# Patient Record
Sex: MALE | Race: WHITE | Employment: FULL TIME | URBAN - METROPOLITAN AREA
[De-identification: names, ages, dates, MRNs, and addresses within clinical notes are randomized per-mention and may not be internally consistent; named-entity substitution may affect disease eponyms.]

---

## 2018-12-23 ENCOUNTER — HOSPITAL ENCOUNTER (EMERGENCY)
Age: 38
Discharge: HOME | End: 2018-12-23
Attending: EMERGENCY MEDICINE
Payer: OTHER MISCELLANEOUS

## 2018-12-23 ENCOUNTER — APPOINTMENT (OUTPATIENT)
Dept: GENERAL RADIOLOGY | Age: 38
End: 2018-12-23
Payer: OTHER MISCELLANEOUS

## 2018-12-23 ENCOUNTER — APPOINTMENT (OUTPATIENT)
Dept: CT IMAGING | Age: 38
End: 2018-12-23
Payer: OTHER MISCELLANEOUS

## 2018-12-23 VITALS
RESPIRATION RATE: 18 BRPM | DIASTOLIC BLOOD PRESSURE: 90 MMHG | HEART RATE: 77 BPM | WEIGHT: 175 LBS | OXYGEN SATURATION: 98 % | SYSTOLIC BLOOD PRESSURE: 125 MMHG | HEIGHT: 70 IN | TEMPERATURE: 97.8 F | BODY MASS INDEX: 25.05 KG/M2

## 2018-12-23 DIAGNOSIS — S16.1XXA ACUTE STRAIN OF NECK MUSCLE, INITIAL ENCOUNTER: Primary | ICD-10-CM
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
## 2018-12-23 PROCEDURE — 72070 X-RAY EXAM THORAC SPINE 2VWS: CPT

## 2018-12-23 PROCEDURE — 99284 EMERGENCY DEPT VISIT MOD MDM: CPT

## 2018-12-23 PROCEDURE — 72125 CT NECK SPINE W/O DYE: CPT

## 2018-12-23 PROCEDURE — 72100 X-RAY EXAM L-S SPINE 2/3 VWS: CPT

## 2018-12-23 PROCEDURE — 6370000000 HC RX 637 (ALT 250 FOR IP): Performed by: PHYSICIAN ASSISTANT

## 2018-12-23 RX ORDER — DIAZEPAM 5 MG/1
5 TABLET ORAL ONCE
Status: COMPLETED | OUTPATIENT
Start: 2018-12-23 | End: 2018-12-23

## 2018-12-23 RX ORDER — CYCLOBENZAPRINE HCL 10 MG
10 TABLET ORAL 3 TIMES DAILY PRN
Qty: 30 TABLET | Refills: 0 | Status: SHIPPED | OUTPATIENT
Start: 2018-12-23 | End: 2019-01-02

## 2018-12-23 RX ORDER — IBUPROFEN 400 MG/1
800 TABLET ORAL ONCE
Status: COMPLETED | OUTPATIENT
Start: 2018-12-23 | End: 2018-12-23

## 2018-12-23 RX ADMIN — IBUPROFEN 800 MG: 400 TABLET, FILM COATED ORAL at 19:16

## 2018-12-23 RX ADMIN — DIAZEPAM 5 MG: 5 TABLET ORAL at 19:16

## 2018-12-23 ASSESSMENT — PAIN SCALES - GENERAL
PAINLEVEL_OUTOF10: 3
PAINLEVEL_OUTOF10: 3

## 2018-12-23 ASSESSMENT — PAIN DESCRIPTION - PAIN TYPE: TYPE: ACUTE PAIN

## 2018-12-23 ASSESSMENT — PAIN DESCRIPTION - LOCATION: LOCATION: NECK;BACK

## 2018-12-23 ASSESSMENT — PAIN DESCRIPTION - DESCRIPTORS: DESCRIPTORS: SORE

## 2018-12-23 ASSESSMENT — PAIN DESCRIPTION - ORIENTATION: ORIENTATION: LOWER;MID

## 2021-03-08 ENCOUNTER — NURSE ONLY (OUTPATIENT)
Dept: PRIMARY CARE CLINIC | Age: 41
End: 2021-03-08
Payer: COMMERCIAL

## 2021-03-08 DIAGNOSIS — Z23 HIGH PRIORITY FOR COVID-19 VIRUS VACCINATION: Primary | ICD-10-CM

## 2021-03-08 PROCEDURE — 91301 COVID-19, MODERNA VACCINE 100MCG/0.5ML DOSE: CPT | Performed by: FAMILY MEDICINE

## 2021-03-08 PROCEDURE — 0011A COVID-19, MODERNA VACCINE 100MCG/0.5ML DOSE: CPT | Performed by: FAMILY MEDICINE

## 2021-04-05 PROCEDURE — 0012A COVID-19, MODERNA VACCINE 100MCG/0.5ML DOSE: CPT | Performed by: NURSE PRACTITIONER

## 2021-04-05 PROCEDURE — 91301 COVID-19, MODERNA VACCINE 100MCG/0.5ML DOSE: CPT | Performed by: NURSE PRACTITIONER

## 2021-04-09 ENCOUNTER — NURSE ONLY (OUTPATIENT)
Dept: PRIMARY CARE CLINIC | Age: 41
End: 2021-04-09
Payer: COMMERCIAL

## 2021-04-09 DIAGNOSIS — Z23 HIGH PRIORITY FOR COVID-19 VIRUS VACCINATION: Primary | ICD-10-CM

## 2025-06-02 ENCOUNTER — APPOINTMENT (OUTPATIENT)
Dept: CT IMAGING | Age: 45
End: 2025-06-02
Payer: COMMERCIAL

## 2025-06-02 ENCOUNTER — HOSPITAL ENCOUNTER (EMERGENCY)
Age: 45
Discharge: HOME | End: 2025-06-02
Attending: EMERGENCY MEDICINE
Payer: COMMERCIAL

## 2025-06-02 ENCOUNTER — APPOINTMENT (OUTPATIENT)
Dept: GENERAL RADIOLOGY | Age: 45
End: 2025-06-02
Payer: COMMERCIAL

## 2025-06-02 VITALS
HEART RATE: 68 BPM | HEIGHT: 70 IN | DIASTOLIC BLOOD PRESSURE: 94 MMHG | OXYGEN SATURATION: 97 % | RESPIRATION RATE: 18 BRPM | SYSTOLIC BLOOD PRESSURE: 145 MMHG | BODY MASS INDEX: 29.45 KG/M2 | TEMPERATURE: 97.9 F | WEIGHT: 205.69 LBS

## 2025-06-02 DIAGNOSIS — Z04.1 ENCOUNTER FOR EXAMINATION FOLLOWING MOTOR VEHICLE COLLISION: Primary | ICD-10-CM

## 2025-06-02 PROCEDURE — 6370000000 HC RX 637 (ALT 250 FOR IP): Performed by: EMERGENCY MEDICINE

## 2025-06-02 PROCEDURE — 72125 CT NECK SPINE W/O DYE: CPT

## 2025-06-02 PROCEDURE — 72072 X-RAY EXAM THORAC SPINE 3VWS: CPT

## 2025-06-02 PROCEDURE — 72100 X-RAY EXAM L-S SPINE 2/3 VWS: CPT

## 2025-06-02 PROCEDURE — 99284 EMERGENCY DEPT VISIT MOD MDM: CPT

## 2025-06-02 RX ORDER — ACETAMINOPHEN 500 MG
1000 TABLET ORAL
Status: COMPLETED | OUTPATIENT
Start: 2025-06-02 | End: 2025-06-02

## 2025-06-02 RX ORDER — METHOCARBAMOL 500 MG/1
500 TABLET, FILM COATED ORAL ONCE
Status: COMPLETED | OUTPATIENT
Start: 2025-06-02 | End: 2025-06-02

## 2025-06-02 RX ORDER — METHOCARBAMOL 500 MG/1
500 TABLET, FILM COATED ORAL 3 TIMES DAILY PRN
Qty: 9 TABLET | Refills: 0 | Status: SHIPPED | OUTPATIENT
Start: 2025-06-02 | End: 2025-06-05

## 2025-06-02 RX ADMIN — IBUPROFEN 600 MG: 200 TABLET, FILM COATED ORAL at 19:39

## 2025-06-02 RX ADMIN — METHOCARBAMOL 500 MG: 500 TABLET ORAL at 19:40

## 2025-06-02 RX ADMIN — ACETAMINOPHEN 1000 MG: 500 TABLET ORAL at 19:38

## 2025-06-02 ASSESSMENT — PAIN - FUNCTIONAL ASSESSMENT: PAIN_FUNCTIONAL_ASSESSMENT: 0-10

## 2025-06-02 ASSESSMENT — LIFESTYLE VARIABLES
HOW OFTEN DO YOU HAVE A DRINK CONTAINING ALCOHOL: MONTHLY OR LESS
HOW MANY STANDARD DRINKS CONTAINING ALCOHOL DO YOU HAVE ON A TYPICAL DAY: 1 OR 2

## 2025-06-02 ASSESSMENT — PAIN DESCRIPTION - ONSET: ONSET: SUDDEN

## 2025-06-02 ASSESSMENT — PAIN DESCRIPTION - LOCATION: LOCATION: BACK;NECK

## 2025-06-02 ASSESSMENT — PAIN SCALES - GENERAL: PAINLEVEL_OUTOF10: 4

## 2025-06-02 ASSESSMENT — PAIN DESCRIPTION - PAIN TYPE: TYPE: ACUTE PAIN

## 2025-06-02 ASSESSMENT — PAIN DESCRIPTION - DESCRIPTORS: DESCRIPTORS: ACHING

## 2025-06-02 ASSESSMENT — PAIN DESCRIPTION - ORIENTATION: ORIENTATION: LOWER;POSTERIOR

## 2025-06-02 ASSESSMENT — PAIN DESCRIPTION - FREQUENCY: FREQUENCY: CONTINUOUS
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
## 2025-06-03 NOTE — DISCHARGE INSTRUCTIONS
You were seen in the emergency department for back pain. Your exam, x-rays and CT scans were reassuring.    You will likely be sore for the next few days.  You may take Tylenol and/or ibuprofen for aches and pains.  You may apply heat or ice to areas of pain, whichever feels better.    We have also prescribed methocarbamol, which is a muscle relaxer that can help with your aches and pains.  This medication can make you sleepy, please not take it and then drive or perform other potentially dangerous tasks.    Call 911 or go to the nearest Emergency Department immediately for worsening symptoms, difficulty breathing, chest pain, lightheadedness, difficulty moving or talking, sensory loss, difficulty controlling your bowel or bladder function, altered level of consciousness, neck stiffness, uncontrollable nausea or vomiting, uncontrollable pain, inability to tolerate oral intake, fever, chills, severe bleeding, signs of infection (spreading redness, area feels very warm, swelling, pain, foul-smelling drainage), thoughts of harming yourself or others, or any other concerns.    If we have prescribed you any new medications, please take them as prescribed and read the drug package insert carefully.  Do not drink alcohol, drive, or operate machinery if you are taking narcotic pain medications.